# Patient Record
Sex: FEMALE | NOT HISPANIC OR LATINO
[De-identification: names, ages, dates, MRNs, and addresses within clinical notes are randomized per-mention and may not be internally consistent; named-entity substitution may affect disease eponyms.]

---

## 2021-12-02 ENCOUNTER — APPOINTMENT (OUTPATIENT)
Dept: OTOLARYNGOLOGY | Facility: CLINIC | Age: 56
End: 2021-12-02
Payer: SELF-PAY

## 2021-12-02 PROBLEM — Z00.00 ENCOUNTER FOR PREVENTIVE HEALTH EXAMINATION: Status: ACTIVE | Noted: 2021-12-02

## 2021-12-02 PROCEDURE — D0090A COSMETIC BOTOX: CUSTOM

## 2023-05-25 ENCOUNTER — APPOINTMENT (OUTPATIENT)
Dept: PLASTIC SURGERY | Facility: CLINIC | Age: 58
End: 2023-05-25
Payer: SELF-PAY

## 2023-05-25 VITALS — WEIGHT: 128 LBS | BODY MASS INDEX: 19.86 KG/M2 | HEIGHT: 67.2 IN

## 2023-05-25 PROBLEM — Z00.00 ENCOUNTER FOR PREVENTIVE HEALTH EXAMINATION: Status: ACTIVE | Noted: 2023-05-25

## 2023-05-25 PROCEDURE — 64612 DESTROY NERVE FACE MUSCLE: CPT

## 2023-05-25 RX ORDER — ACETAMINOPHEN/DIPHENHYDRAMINE 500MG-25MG
1000 TABLET ORAL
Refills: 0 | Status: ACTIVE | COMMUNITY

## 2023-10-06 ENCOUNTER — APPOINTMENT (OUTPATIENT)
Dept: PLASTIC SURGERY | Facility: CLINIC | Age: 58
End: 2023-10-06
Payer: SELF-PAY

## 2023-10-06 PROCEDURE — 64612 DESTROY NERVE FACE MUSCLE: CPT

## 2023-11-17 ENCOUNTER — APPOINTMENT (OUTPATIENT)
Dept: PLASTIC SURGERY | Facility: CLINIC | Age: 58
End: 2023-11-17

## 2023-12-06 ENCOUNTER — APPOINTMENT (OUTPATIENT)
Dept: PLASTIC SURGERY | Facility: CLINIC | Age: 58
End: 2023-12-06
Payer: SELF-PAY

## 2023-12-06 DIAGNOSIS — L98.8 OTHER SPECIFIED DISORDERS OF THE SKIN AND SUBCUTANEOUS TISSUE: ICD-10-CM

## 2024-02-21 ENCOUNTER — APPOINTMENT (OUTPATIENT)
Dept: PLASTIC SURGERY | Facility: CLINIC | Age: 59
End: 2024-02-21
Payer: SELF-PAY

## 2024-02-21 PROCEDURE — 64612 DESTROY NERVE FACE MUSCLE: CPT

## 2024-03-21 ENCOUNTER — APPOINTMENT (OUTPATIENT)
Dept: NEUROSURGERY | Facility: CLINIC | Age: 59
End: 2024-03-21
Payer: MEDICAID

## 2024-03-21 VITALS
HEART RATE: 76 BPM | BODY MASS INDEX: 20.09 KG/M2 | OXYGEN SATURATION: 98 % | DIASTOLIC BLOOD PRESSURE: 88 MMHG | WEIGHT: 128 LBS | SYSTOLIC BLOOD PRESSURE: 138 MMHG | HEIGHT: 67 IN

## 2024-03-21 DIAGNOSIS — M47.812 SPONDYLOSIS W/OUT MYELOPATHY OR RADICULOPATHY, CERVICAL REGION: ICD-10-CM

## 2024-03-21 PROCEDURE — 99205 OFFICE O/P NEW HI 60 MIN: CPT

## 2024-03-26 PROBLEM — M47.812 CERVICAL SPONDYLOSIS: Status: ACTIVE | Noted: 2024-03-26

## 2024-03-26 NOTE — HISTORY OF PRESENT ILLNESS
[de-identified] : MIKIE SANTIAGO is a 58 year old right handed female with a PMH of chronic back pain, right hand weakness/atrophy, who presents to the office today for neurosurgical consultation at the request of Dr. Mohr due to right hand atrophy and weakness.   She feels no pain but feels her joints are out of place when she moves her neck. It started seven years ago in 2017. She saw a surgeon at [facility] that recommended surgery.  It is exacerbated by cold and relieved by no factors except warm water minimally.   She notes atrophy right hand started in 2017/2018 started having dexterity issues with initiating a lighter, handwriting, difficulty with zippers. Endorses neck pain down the left side with numbness at night in 2018. She was offered surgery in 2018. Still has tingling in first three digits of left hand. Had a normal MRI of the wrist which was normal. There has been no known trauma precipitating the pain.  Endorses cramping in fingers. The patient denies numbness of extremities, weakness of extremities, bowel or bladder incontinence and gait disturbance. She had mid thoracic and cervical back pain radiating upward in February 2023 and improved over time. She has tried PT, and pain medications including Vicodin,  Oxycodone, Tramadol. She has undergone imaging in the form of MRI cervical spine which revealed diffuse advanced degenerative spondylosis with multilevel canal and neural foraminal narrowing, not significantly changed from prior exam. Left paracentral disc extrusion at C2/C3 is again noted compressing the cord. No discrete cord signal abnormality. No abnormal enhancement (see detailed report below). She also underwent an MRI brachial plexus revealing no evidence of mass lesion or abnormal enhancement along the course of the bilateral brachial plexus. 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   Saw rheumatologist in past and had a normal exam and no autoimmune etiology.  Father-Chrohns disease Surg Hx:  varicose veins  Meds:  Fluticasone, Omega 3, Magnesium, neutrophol, DHA fish oil, lions [place], Vitamin K2, Vitamin B complex, Vitamin B12, Vitamin C, Vitamin D, Vitamin E  Allergies: NKDA  Soc Hx: Former smoker in High school, social EtOH two to three alcohol drinks every other day, lives with children, works in [employer]. [place] [facility] [place]

## 2024-03-26 NOTE — ASSESSMENT
[FreeTextEntry1] : MIKIE SANTIAGO is a 58 year old female with a PMH of hypertension, chronic back pain, right hand right hand weakness/atrophy, who presents to the office today for neurosurgical consultation at the request of Dr. Mohr due to right hand atrophy and weakness in the setting of cervical spondylosis.   I reviewed her MRI using the spine model to aid in interpretation.  She has multilevel cervical spondylosis with reversal of her cervical lordosis resulting in multilevel central canal and neuroforaminal disease.  At C2-3 there is a left-sided paracentral disc herniation causing cord compression.  There is disc osteophyte complex at C6-7 resulting in significant right-sided neuroforaminal stenosis as well as moderate stenosis of the left-sided foramen.  At C7-T1 there is disc osteophyte formation with moderate to severe bilateral neuroforaminal stenosis.  I have discussed the natural history and treatment options for cervical myelopathy and radiculopathy with the patient. I explained the indications for observation, conservative management, medical management, physical therapy, pain management approaches and surgery. I explained the different types and surgical approaches including anterior and posterior approaches as well as decompression only procedures and instrumented fusions.   In the end, I recommend additional imaging in the form of CT cervical spine to better visualize the extent of osteophyte complex development and facet arthropathy, as well as upright cervical spine X-rays with flexion and extension views to demonstrate the regional spinal alignment and to rule out any visible dynamic instability. The patient may return to the office once imaging completed for further treatment planning. She was also advised to reach out to us sooner if she is to develop weakness or bowel/bladder symptoms.  I spent 60 minutes relative to this patient encounter.

## 2024-05-01 ENCOUNTER — APPOINTMENT (OUTPATIENT)
Dept: PLASTIC SURGERY | Facility: CLINIC | Age: 59
End: 2024-05-01
Payer: SELF-PAY

## 2024-05-01 DIAGNOSIS — Z41.1 ENCOUNTER FOR COSMETIC SURGERY: ICD-10-CM

## 2024-05-01 PROCEDURE — 64612 DESTROY NERVE FACE MUSCLE: CPT

## 2024-05-01 NOTE — ASSESSMENT
[FreeTextEntry1] : A: Facial rhytids  P; Botox to glabella and Crow's feet, tolerated well, instructions reviewed.

## 2024-05-01 NOTE — REASON FOR VISIT
[Procedure: _________] : a [unfilled] procedure visit [FreeTextEntry1] : Patient returns for Botox to glabella frown lines and Crow's feet lines

## 2024-05-01 NOTE — PROCEDURE
[FreeTextEntry1] : Facial rhytids  [FreeTextEntry2] : Botox to glabella frown lines and Crow's feet lines  [FreeTextEntry3] : none  [FreeTextEntry4] : minimal  [FreeTextEntry5] : none  [FreeTextEntry6] : Patient received 10 units Botox to glabella and 10 units to Crow's feet. Tolerated well.  [FreeTextEntry7] : none

## 2024-06-10 ENCOUNTER — NON-APPOINTMENT (OUTPATIENT)
Age: 59
End: 2024-06-10

## 2024-06-11 ENCOUNTER — RESULT REVIEW (OUTPATIENT)
Age: 59
End: 2024-06-11

## 2024-06-11 ENCOUNTER — APPOINTMENT (OUTPATIENT)
Dept: NEUROSURGERY | Facility: CLINIC | Age: 59
End: 2024-06-11
Payer: MEDICAID

## 2024-06-11 VITALS
DIASTOLIC BLOOD PRESSURE: 80 MMHG | HEIGHT: 67 IN | OXYGEN SATURATION: 99 % | HEART RATE: 80 BPM | SYSTOLIC BLOOD PRESSURE: 140 MMHG

## 2024-06-11 DIAGNOSIS — M47.12 OTHER SPONDYLOSIS WITH MYELOPATHY, CERVICAL REGION: ICD-10-CM

## 2024-06-11 DIAGNOSIS — M54.50 LOW BACK PAIN, UNSPECIFIED: ICD-10-CM

## 2024-06-11 DIAGNOSIS — M47.22 OTHER SPONDYLOSIS WITH MYELOPATHY, CERVICAL REGION: ICD-10-CM

## 2024-06-11 DIAGNOSIS — M62.541 MUSCLE WASTING AND ATROPHY, NOT ELSEWHERE CLASSIFIED, RIGHT HAND: ICD-10-CM

## 2024-06-11 PROCEDURE — 99215 OFFICE O/P EST HI 40 MIN: CPT

## 2024-06-11 NOTE — ASSESSMENT
[FreeTextEntry1] : MIKIE SANTIAGO is a 58 year old female with a PMH of hypertension, chronic back pain, returning to my office for her right hand atrophy and weakness in the setting of cervical spondylosis.   I reviewed her CT cervical spine and x-ray cervical spine in the office today. She has significant multilevel cervical spondylosis, resulting in kyphosis and development of significant osteophyte formations. There is near-complete disc space collapse at C3-4 and C4-5 as well as complete disc space collapse with resulting auto-fusion at C5-6. Her X-rays also demonstrate similar findings with some reduction of the kyphosis with extension. No dynamic instability.   Previous visit and again today, I reviewed her MRI using the spine model to aid in interpretation.  She has multilevel cervical spondylosis with reversal of her cervical lordosis resulting in multilevel central canal and neuroforaminal disease.  At C2-3 there is a left-sided paracentral disc herniation causing cord compression.  There is disc osteophyte complex at C6-7 resulting in significant right-sided neuroforaminal stenosis as well as moderate stenosis of the left-sided foramen.  At C7-T1 there is disc osteophyte formation with moderate to severe bilateral neuroforaminal stenosis.  I have discussed the natural history and treatment options for cervical myelopathy and radiculopathy with the patient. I explained the indications for observation, conservative management, medical management, physical therapy, pain management approaches and surgery. I explained the different types and surgical approaches including anterior and posterior approaches as well as decompression only procedures and instrumented fusions. I do believe that she would benefit most from a posterior C2-T2 laminectomy and fusion.   She does however suffer most from the effects of her large breasts. Her posture is significantly altered which is leading to worsening of her cervical spondylosis as well as significant upper thoracic pain. I believe that she should have a breast reduction surgery to help with her overall posture and to regain a more physically active lifestyle without this limitation. After this surgery we will watch her overall progress and discuss timing of her cervical spine surgery.   In the end I recommend surgical intervention in the form of C2-T2.  The patient will need NP clearance with the Presurgical Testing Department at [facility] prior to the procedure.  At the end of the discussion, the patient opted to move forward with the above plan.  Our office will reach out to coordinate a surgical date in the coming weeks.  The patient understands the plan of care and is in agreement.  All questions answered to patient satisfaction. She was also advised to reach out to us sooner if she is to develop weakness or bowel/bladder symptoms.  I have spent 45 minutes relative to this encounter, including >50% of this time being with the patient in the office.

## 2024-06-11 NOTE — PHYSICAL EXAM
[General Appearance - Alert] : alert [General Appearance - In No Acute Distress] : in no acute distress [Fluency] : fluency intact [Comprehension] : comprehension intact [Cranial Nerves Optic (II)] : visual acuity intact bilaterally,  pupils equal round and reactive to light [Cranial Nerves Oculomotor (III)] : extraocular motion intact [Cranial Nerves Trigeminal (V)] : facial sensation intact symmetrically [Cranial Nerves Facial (VII)] : face symmetrical [Cranial Nerves Vestibulocochlear (VIII)] : hearing was intact bilaterally [Cranial Nerves Glossopharyngeal (IX)] : tongue and palate midline [Cranial Nerves Hypoglossal (XII)] : there was no tongue deviation with protrusion [Cranial Nerves Accessory (XI - Cranial And Spinal)] : head turning and shoulder shrug symmetric [Sensation Tactile Decrease] : light touch was intact [Abnormal Walk] : normal gait [3+] : Ankle jerk right 3+ [2+] : Ankle jerk left 2+ [Normal] : normal [FreeTextEntry6] : right FE 4- right FF 4- left FF 4 left FE 4 atrophy right hand [FreeTextEntry7] : +Tinels left ulnar

## 2024-06-11 NOTE — HISTORY OF PRESENT ILLNESS
[de-identified] : Mikie Santiago presents for interval follow up and imaging review for right hand atrophy and weakness in setting of severe cervical spondylosis.   She underwent CT cervical spine and x-ray cervical spine prior to her visit today. Not doing home exercises or PT.  She continues to have neck pain radiating down the left arm with dexterity issues.  Her biggest complaint at this time is her physical limitations due to upper thoracic pain from correction of posture secondary to her large breasts. She feels that her posture is constantly affected and she feels that the weight of her chest is significantly affecting her quality of life and ability to exercise.   3/21/24: MIKIE SANTIAGO is a 58 year old right handed female with a PMH of chronic back pain, right hand weakness/atrophy, who presents to the office today for neurosurgical consultation at the request of Dr. Mohr due to right hand atrophy and weakness.   She feels no pain but feels her joints are out of place when she moves her neck. It started seven years ago in 2017. She saw a surgeon at [facility] that recommended surgery.  It is exacerbated by cold and relieved by no factors except warm water minimally.   She notes atrophy right hand started in 2017/2018 started having dexterity issues with initiating a lighter, handwriting, difficulty with zippers. Endorses neck pain down the left side with numbness at night in 2018. She was offered surgery in 2018. Still has tingling in first three digits of left hand. Had a normal MRI of the wrist which was normal. There has been no known trauma precipitating the pain.  Endorses cramping in fingers. The patient denies numbness of extremities, weakness of extremities, bowel or bladder incontinence and gait disturbance. She had mid thoracic and cervical back pain radiating upward in February 2023 and improved over time. She has tried PT, and pain medications including Vicodin,  Oxycodone, Tramadol. She has undergone imaging in the form of MRI cervical spine which revealed diffuse advanced degenerative spondylosis with multilevel canal and neural foraminal narrowing, not significantly changed from prior exam. Left paracentral disc extrusion at C2/C3 is again noted compressing the cord. No discrete cord signal abnormality. No abnormal enhancement (see detailed report below). She also underwent an MRI brachial plexus revealing no evidence of mass lesion or abnormal enhancement along the course of the bilateral brachial plexus. 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   Saw rheumatologist in past and had a normal exam and no autoimmune etiology.  Father-Chrohns disease Surg Hx:  varicose veins  Meds:  Fluticasone, Omega 3, Magnesium, neutrophol, DHA fish oil, [place], Vitamin K2, Vitamin B complex, Vitamin B12, Vitamin C, Vitamin D, Vitamin E  Allergies: NKDA  Soc Hx: Former smoker in High school, social EtOH two to three alcohol drinks every other day, lives with children, works in [employer]. Recently moved [place] to [facility] [place] [place].

## 2024-07-15 ENCOUNTER — APPOINTMENT (OUTPATIENT)
Dept: PLASTIC SURGERY | Facility: CLINIC | Age: 59
End: 2024-07-15
Payer: COMMERCIAL

## 2024-07-15 VITALS
DIASTOLIC BLOOD PRESSURE: 93 MMHG | SYSTOLIC BLOOD PRESSURE: 155 MMHG | HEART RATE: 67 BPM | RESPIRATION RATE: 18 BRPM | TEMPERATURE: 98 F | OXYGEN SATURATION: 100 %

## 2024-07-15 DIAGNOSIS — S01.111A LACERATION W/OUT FOREIGN BODY OF RIGHT EYELID AND PERIOCULAR AREA, INITIAL ENCOUNTER: ICD-10-CM

## 2024-07-15 PROCEDURE — 99213 OFFICE O/P EST LOW 20 MIN: CPT

## 2024-07-24 ENCOUNTER — APPOINTMENT (OUTPATIENT)
Dept: PLASTIC SURGERY | Facility: CLINIC | Age: 59
End: 2024-07-24

## 2024-08-14 ENCOUNTER — APPOINTMENT (OUTPATIENT)
Dept: PLASTIC SURGERY | Facility: CLINIC | Age: 59
End: 2024-08-14
Payer: COMMERCIAL

## 2024-08-14 DIAGNOSIS — M54.9 DORSALGIA, UNSPECIFIED: ICD-10-CM

## 2024-08-14 DIAGNOSIS — M54.2 CERVICALGIA: ICD-10-CM

## 2024-08-14 DIAGNOSIS — Z41.1 ENCOUNTER FOR COSMETIC SURGERY: ICD-10-CM

## 2024-08-14 DIAGNOSIS — N62 HYPERTROPHY OF BREAST: ICD-10-CM

## 2024-08-14 PROCEDURE — 99024 POSTOP FOLLOW-UP VISIT: CPT

## 2024-08-14 PROCEDURE — 99213 OFFICE O/P EST LOW 20 MIN: CPT

## 2024-08-14 NOTE — REASON FOR VISIT
[Follow-Up: _____] : a [unfilled] follow-up visit [FreeTextEntry1] : Patient interested in Botox but still healingf from upper eyelid laceration

## 2024-08-14 NOTE — REASON FOR VISIT
[Consultation] : a consultation visit [FreeTextEntry1] : Patient with back, neck, and shoulder pain due to the large size of her breasts

## 2024-08-14 NOTE — HISTORY OF PRESENT ILLNESS
[FreeTextEntry1] : Patient has been followed by orthopedic and rehab for upper back, neck and shoulder pain, and was referred for evaluation and management of symptomatic macromastia.   She has a history of cervical spondylosis with myopathy and rediculopathy.

## 2024-08-14 NOTE — ASSESSMENT
[FreeTextEntry1] : A: Patient with symptomatic macromastia and back neck, and shoulder pain P: We discussed the treatment options at some length and favor bilateral reduction mammaplasty, wise pattern, inferior central pedicle and anchor shaped scar.  Reviewed the material risks,  benefits,  and alternatives with Ms. CHINTAN SANTIAGO  , including no surgery, and she  understands and wishes to proceed.

## 2024-08-14 NOTE — PHYSICAL EXAM
[NI] : Normal [de-identified] : Bilateral macromastia with left side larger and more ptotic than the left side. Sternal notch to nipple 31 cm on the left side and 28 cm on the right side.   There are no palpable masses and no adenopathy.

## 2024-08-26 ENCOUNTER — APPOINTMENT (OUTPATIENT)
Dept: VASCULAR SURGERY | Facility: CLINIC | Age: 59
End: 2024-08-26

## 2024-08-28 ENCOUNTER — APPOINTMENT (OUTPATIENT)
Dept: VASCULAR SURGERY | Facility: CLINIC | Age: 59
End: 2024-08-28

## 2024-08-29 ENCOUNTER — APPOINTMENT (OUTPATIENT)
Dept: PLASTIC SURGERY | Facility: CLINIC | Age: 59
End: 2024-08-29
Payer: SELF-PAY

## 2024-08-29 DIAGNOSIS — Z41.1 ENCOUNTER FOR COSMETIC SURGERY: ICD-10-CM

## 2024-08-29 PROCEDURE — 64612 DESTROY NERVE FACE MUSCLE: CPT

## 2024-08-29 NOTE — REASON FOR VISIT
[Procedure: _________] : a [unfilled] procedure visit [FreeTextEntry1] : Patient returns for treatment of glabellar frown lines and forehead rhytids with Botox

## 2024-08-29 NOTE — ASSESSMENT
[FreeTextEntry1] : A: Monica orbital rhytids P: Botox to forehead and glabella Tolerated well, instructions reviewed.

## 2024-08-29 NOTE — PROCEDURE
[FreeTextEntry1] : Monica orbital rhytids  [FreeTextEntry2] : Botox to forehead and glabella [FreeTextEntry3] : Topical Quadricaine  [FreeTextEntry4] : minimal  [FreeTextEntry5] : none  [FreeTextEntry6] : Following pre treatment with Quadricaine, 20 units Botox injected in the forehead and 20 units in the Crow's feet lines. Patient tolerated well.   [FreeTextEntry7] : none

## 2024-09-12 ENCOUNTER — APPOINTMENT (OUTPATIENT)
Dept: PLASTIC SURGERY | Facility: CLINIC | Age: 59
End: 2024-09-12
Payer: SELF-PAY

## 2024-09-12 DIAGNOSIS — Z41.1 ENCOUNTER FOR COSMETIC SURGERY: ICD-10-CM

## 2024-09-12 PROCEDURE — 99024 POSTOP FOLLOW-UP VISIT: CPT

## 2024-09-12 NOTE — ASSESSMENT
[FreeTextEntry1] : A: Residual rhytids right medial brow P Botox 8 unit touch up injected in sterile fashion. Tolerated well, instructions reviewed.

## 2024-09-12 NOTE — PROCEDURE
[FreeTextEntry1] : Residual rhytids right medial brow [FreeTextEntry2] : Botox 8 unit touch up  [FreeTextEntry3] : none  [FreeTextEntry4] : minimal  [FreeTextEntry5] : none  [FreeTextEntry6] : Following sterile prep, 4 units right medial brow and 4 to glabella. Tolerated well. [FreeTextEntry7] : none

## 2024-09-12 NOTE — REASON FOR VISIT
[Procedure: _________] : a [unfilled] procedure visit [FreeTextEntry1] : Patient returns with small residual movement with rhytids right medial brow

## 2024-09-27 ENCOUNTER — APPOINTMENT (OUTPATIENT)
Dept: PLASTIC SURGERY | Facility: CLINIC | Age: 59
End: 2024-09-27
Payer: SELF-PAY

## 2024-09-27 PROCEDURE — MS002: CPT

## 2024-10-03 NOTE — REASON FOR VISIT
[TextEntry] : Fay came in for hydrafacial treatment. consent signed. 15% glysal applied. extractions made on nose and chin. brightalive mask with ice globe massaged. red and blue led light. Gave her a sample of alpharet.

## 2024-10-30 ENCOUNTER — APPOINTMENT (OUTPATIENT)
Dept: PLASTIC SURGERY | Facility: CLINIC | Age: 59
End: 2024-10-30

## 2024-10-30 ENCOUNTER — APPOINTMENT (OUTPATIENT)
Dept: PLASTIC SURGERY | Facility: CLINIC | Age: 59
End: 2024-10-30
Payer: SELF-PAY

## 2024-10-30 DIAGNOSIS — L98.8 OTHER SPECIFIED DISORDERS OF THE SKIN AND SUBCUTANEOUS TISSUE: ICD-10-CM

## 2024-10-30 DIAGNOSIS — N62 HYPERTROPHY OF BREAST: ICD-10-CM

## 2024-10-30 PROCEDURE — 99212 OFFICE O/P EST SF 10 MIN: CPT | Mod: 25

## 2024-10-30 PROCEDURE — 64612 DESTROY NERVE FACE MUSCLE: CPT

## 2024-10-31 ENCOUNTER — APPOINTMENT (OUTPATIENT)
Facility: CLINIC | Age: 59
End: 2024-10-31

## 2024-11-26 ENCOUNTER — APPOINTMENT (OUTPATIENT)
Dept: NEUROSURGERY | Facility: CLINIC | Age: 59
End: 2024-11-26
Payer: COMMERCIAL

## 2024-11-26 DIAGNOSIS — M47.22 OTHER SPONDYLOSIS WITH MYELOPATHY, CERVICAL REGION: ICD-10-CM

## 2024-11-26 DIAGNOSIS — M47.12 OTHER SPONDYLOSIS WITH MYELOPATHY, CERVICAL REGION: ICD-10-CM

## 2024-11-26 PROCEDURE — 99215 OFFICE O/P EST HI 40 MIN: CPT

## 2024-12-06 ENCOUNTER — APPOINTMENT (OUTPATIENT)
Dept: PLASTIC SURGERY | Facility: CLINIC | Age: 59
End: 2024-12-06
Payer: SELF-PAY

## 2024-12-06 PROCEDURE — MS002: CPT

## 2024-12-27 ENCOUNTER — APPOINTMENT (OUTPATIENT)
Dept: PLASTIC SURGERY | Facility: CLINIC | Age: 59
End: 2024-12-27

## 2025-01-02 ENCOUNTER — APPOINTMENT (OUTPATIENT)
Dept: PLASTIC SURGERY | Facility: CLINIC | Age: 60
End: 2025-01-02
Payer: SELF-PAY

## 2025-01-02 DIAGNOSIS — L98.8 OTHER SPECIFIED DISORDERS OF THE SKIN AND SUBCUTANEOUS TISSUE: ICD-10-CM

## 2025-01-02 PROCEDURE — 64612 DESTROY NERVE FACE MUSCLE: CPT

## 2025-01-14 ENCOUNTER — TRANSCRIPTION ENCOUNTER (OUTPATIENT)
Age: 60
End: 2025-01-14

## 2025-01-14 ENCOUNTER — APPOINTMENT (OUTPATIENT)
Facility: CLINIC | Age: 60
End: 2025-01-14

## 2025-01-23 ENCOUNTER — APPOINTMENT (OUTPATIENT)
Facility: CLINIC | Age: 60
End: 2025-01-23
Payer: COMMERCIAL

## 2025-01-23 DIAGNOSIS — N62 HYPERTROPHY OF BREAST: ICD-10-CM

## 2025-01-23 PROCEDURE — 99203 OFFICE O/P NEW LOW 30 MIN: CPT

## 2025-01-28 ENCOUNTER — APPOINTMENT (OUTPATIENT)
Dept: PLASTIC SURGERY | Facility: HOSPITAL | Age: 60
End: 2025-01-28
Payer: COMMERCIAL

## 2025-01-28 ENCOUNTER — TRANSCRIPTION ENCOUNTER (OUTPATIENT)
Age: 60
End: 2025-01-28

## 2025-01-28 ENCOUNTER — RESULT REVIEW (OUTPATIENT)
Age: 60
End: 2025-01-28

## 2025-01-28 PROCEDURE — 19318 BREAST REDUCTION: CPT | Mod: 50

## 2025-02-05 ENCOUNTER — APPOINTMENT (OUTPATIENT)
Dept: PLASTIC SURGERY | Facility: CLINIC | Age: 60
End: 2025-02-05
Payer: COMMERCIAL

## 2025-02-05 VITALS
TEMPERATURE: 98.8 F | SYSTOLIC BLOOD PRESSURE: 147 MMHG | DIASTOLIC BLOOD PRESSURE: 83 MMHG | HEART RATE: 72 BPM | OXYGEN SATURATION: 100 %

## 2025-02-05 DIAGNOSIS — N62 HYPERTROPHY OF BREAST: ICD-10-CM

## 2025-02-05 PROCEDURE — 99024 POSTOP FOLLOW-UP VISIT: CPT

## 2025-02-26 ENCOUNTER — APPOINTMENT (OUTPATIENT)
Dept: PLASTIC SURGERY | Facility: CLINIC | Age: 60
End: 2025-02-26
Payer: COMMERCIAL

## 2025-02-26 VITALS — SYSTOLIC BLOOD PRESSURE: 143 MMHG | DIASTOLIC BLOOD PRESSURE: 88 MMHG | OXYGEN SATURATION: 100 % | HEART RATE: 72 BPM

## 2025-02-26 PROCEDURE — 99024 POSTOP FOLLOW-UP VISIT: CPT

## 2025-03-05 ENCOUNTER — APPOINTMENT (OUTPATIENT)
Dept: PLASTIC SURGERY | Facility: CLINIC | Age: 60
End: 2025-03-05
Payer: COMMERCIAL

## 2025-03-05 DIAGNOSIS — N62 HYPERTROPHY OF BREAST: ICD-10-CM

## 2025-03-05 PROCEDURE — 99024 POSTOP FOLLOW-UP VISIT: CPT

## 2025-03-13 ENCOUNTER — APPOINTMENT (OUTPATIENT)
Dept: PLASTIC SURGERY | Facility: CLINIC | Age: 60
End: 2025-03-13
Payer: SELF-PAY

## 2025-03-13 DIAGNOSIS — Z41.1 ENCOUNTER FOR COSMETIC SURGERY: ICD-10-CM

## 2025-03-13 PROCEDURE — 64612 DESTROY NERVE FACE MUSCLE: CPT

## 2025-04-25 ENCOUNTER — APPOINTMENT (OUTPATIENT)
Dept: PLASTIC SURGERY | Facility: CLINIC | Age: 60
End: 2025-04-25

## 2025-05-06 ENCOUNTER — APPOINTMENT (OUTPATIENT)
Dept: PLASTIC SURGERY | Facility: CLINIC | Age: 60
End: 2025-05-06
Payer: COMMERCIAL

## 2025-05-06 DIAGNOSIS — Z41.1 ENCOUNTER FOR COSMETIC SURGERY: ICD-10-CM

## 2025-05-06 PROCEDURE — 64612 DESTROY NERVE FACE MUSCLE: CPT

## 2025-05-21 ENCOUNTER — APPOINTMENT (OUTPATIENT)
Dept: PLASTIC SURGERY | Facility: CLINIC | Age: 60
End: 2025-05-21

## 2025-08-06 ENCOUNTER — APPOINTMENT (OUTPATIENT)
Dept: PLASTIC SURGERY | Facility: CLINIC | Age: 60
End: 2025-08-06
Payer: SELF-PAY

## 2025-08-06 DIAGNOSIS — Z41.1 ENCOUNTER FOR COSMETIC SURGERY: ICD-10-CM

## 2025-08-27 ENCOUNTER — APPOINTMENT (OUTPATIENT)
Dept: PLASTIC SURGERY | Facility: CLINIC | Age: 60
End: 2025-08-27
Payer: SELF-PAY

## 2025-08-27 DIAGNOSIS — L98.8 OTHER SPECIFIED DISORDERS OF THE SKIN AND SUBCUTANEOUS TISSUE: ICD-10-CM
